# Patient Record
Sex: FEMALE | Race: WHITE | NOT HISPANIC OR LATINO | Employment: FULL TIME | URBAN - METROPOLITAN AREA
[De-identification: names, ages, dates, MRNs, and addresses within clinical notes are randomized per-mention and may not be internally consistent; named-entity substitution may affect disease eponyms.]

---

## 2020-08-24 ENCOUNTER — TRANSFERRED RECORDS (OUTPATIENT)
Dept: HEALTH INFORMATION MANAGEMENT | Facility: CLINIC | Age: 60
End: 2020-08-24

## 2020-08-24 LAB
CHOLEST SERPL-MCNC: 222 MG/DL (ref 100–199)
HDLC SERPL-MCNC: 78 MG/DL
LDLC SERPL CALC-MCNC: 130 MG/DL (ref 0–99)
TRIGL SERPL-MCNC: 69 MG/DL (ref 0–149)

## 2021-04-14 ENCOUNTER — OFFICE VISIT (OUTPATIENT)
Dept: VASCULAR SURGERY | Facility: CLINIC | Age: 61
End: 2021-04-14
Payer: COMMERCIAL

## 2021-04-14 DIAGNOSIS — I83.813 VARICOSE VEINS OF BOTH LOWER EXTREMITIES WITH PAIN: Primary | ICD-10-CM

## 2021-04-14 PROCEDURE — 99203 OFFICE O/P NEW LOW 30 MIN: CPT | Performed by: SURGERY

## 2021-04-14 NOTE — LETTER
4/14/2021         RE: Jess Ureña  [address] [place] [address]        Dear Colleague,    Thank you for referring your patient, Jess Ureña, to the [facility] [place]. Please see a copy of my visit note below.        VEINSOLUTIONS CONSULTATION    HPI:    Jess Ureña is a 60 year old female who presents with complaints of *** lower extremity pain ***and varicose veins.  Varicose veins have been present for *** . The pain is described as an aching, tiredness, heaviness, worse when standing for long periods of time *** and improving with *** .  The patient *** used compression hose *** .  ***  She was evaluated in 05/2019 by Dr. [place] Contreras at [facility] for bilateral stab phlebectomy.***    I have reviewed and updated the history.   PAST MEDICAL HISTORY:   Past Medical History:   Diagnosis Date     Macular degeneration    External hemorrhoids  MINERVA  Migraines  History of leukopenia    Current Outpatient Medications   Medication Sig Dispense Refill     Black Cohosh (REMIFEMIN PO) Take  by mouth.       Multiple Vitamins-Minerals (OCUVITE PRESERVISION PO) Take  by mouth.         PAST SURGICAL HISTORY:   Past Surgical History:   Procedure Laterality Date     COLONOSCOPY      nov 2012, mother with colonic polyps     COLONOSCOPY  11/21/2012    Procedure: COLONOSCOPY;  COLONOSCOPY;  Surgeon: Mackenzie Norwood MD;  Location:  GI     GYN SURGERY      abd [facility] with oopherectomy     partial thyroidectomy[       TONSILLECTOMY     Left GSV RFA ablation (per OSH notes)    ALLERGIES:  No Known Allergies    FAMILY HISTORY:   Family History   Problem Relation Age of Onset     Breast Cancer Mother      Gastrointestinal Disease Mother         colonic polyps     *** has varicose veins in the family. No known family history of hypercoagulability or hemophilias.     SOCIAL HISTORY:   Social History     Tobacco Use     Smoking status: Never Smoker   Substance Use Topics     Alcohol  use: Yes     Comment: 1/month     *** smoker. Works as ***    REVIEW OF SYSTEMS:  10-pt ROS negative except as noted in HPI.       Vital signs:  There were no vitals taken for this visit.    PHYSICAL EXAM:  General: Sitting comfortably in chair, NAD.   HEENT: EOMI and conjugate, MMM   Respiratory: Normal respiratory effort.   Cardiovascular: Pulse is regular.   Musculoskeletal: Normal walking gait around exam room. Grossly normal and symmetric strength and ROM in BLE. *** edema present.  Vascular: dorsalis pedis: ***; posterior tibial: ***.  Cap refill < 3 sec over feet/toes.  Veins: *** varicosities present. *** telangiectasias present.  Neurologic: A&Ox4  Psychiatric: Pleasantly conversant     Venous Duplex Ultrasound:   I have reviewed the following images.  ***      ASSESSMENT / PLAN:  Jess Ureña is a 60 year old female who presents with complaints of ***     ***  I briefly discussed the pathophysiology of venous reflux and how that may be contributing to varicose veins   Will do a venous duplex ultrasound to evaluate for reflux and venous incompetence, along with deep venous patency  Recommended a trial of conservative therapy with 20-30 mmHg ***-high compression hose, to be worn daily  Will have *** follow up in clinic to evaluate response to conservative therapy and discuss further interventions if needed      Estela Schrader MD        Again, thank you for allowing me to participate in the care of your patient.        Sincerely,        Estela Schrader MD

## 2021-04-14 NOTE — PROGRESS NOTES
After Visit Follow Up  Per pt request, faxed their compression hose Rx to [facility].   Pt would like 2 pair(s) of Beige, Closed-toe, Knee high, 20-30mmhg compression hose. Fax confirmed.    Pt aware they will be shipped to their home address.    Miracle Avilez

## 2021-04-14 NOTE — PROGRESS NOTES
After Visit Follow Up  Per Dr. Schrader, patient was recommended to have 2 - 4 sessions at $407 of cosmetic sclerotherapy.    Patient in agreement with plan and had no further questions.    Miracle Avilez on 4/14/2021 at 2:11 PM

## 2021-04-14 NOTE — LETTER
4/14/2021         RE: Jess Ureña  [address] [place] [address]        Dear Colleague,    Thank you for referring your patient, Jess Ureña, to the [facility] [place]. Please see a copy of my visit note below.        VEINSOLUTIONS CONSULTATION    HPI:    Jess Ureña is a 60 year old female who presents with complaints of bilateral lower extremity pain and varicose veins.  Varicose veins have been present for years. She had a prior procedure on the left leg, about 12 years ago: she reports laser ablation of the left leg--unclear if this was the GSV or SSV. The pain is described as an aching, worse when standing for long periods of time and improving with elevation or walking around. Occasional swelling around the ankles. The left leg is the predominantly symptomatic leg. No itching of the skin. No history of blood clots or ulcers. She used compression hose in the remote past but is not currently wearing them.  She also notices that her lateral posterior left leg will feel cold if she sits for a prolonged period of time. She denies numbness or tingling. No weakness. She does not take analgesics.     [facility] [place] [facility]. She was told she would need GETA and did not want to undergo the procedure due to the need for anesthesia.     I have reviewed and updated the history.   PAST MEDICAL HISTORY:   Past Medical History:   Diagnosis Date     Macular degeneration    External hemorrhoids  MINERVA  Migraines  History of leukopenia    Current Outpatient Medications   Medication Sig Dispense Refill     Black Cohosh (REMIFEMIN PO) Take  by mouth.       Multiple Vitamins-Minerals (OCUVITE PRESERVISION PO) Take  by mouth.         PAST SURGICAL HISTORY:   Past Surgical History:   Procedure Laterality Date     AS THYROID LOBECTOMY,UNILAT Left 02/09/2010     COLONOSCOPY      nov 2012, mother with colonic polyps      COLONOSCOPY  11/21/2012    Procedure: COLONOSCOPY;  COLONOSCOPY;  Surgeon: Mackenzie Norwood MD;  Location: [facility]     HYSTERECTOMY TOTAL ABDOMINAL  02/23/2005    with left salpingoopherectomy and right ovarian cystectomy; done for endometriosis     TONSILLECTOMY     Left GSV RFA ablation (per OSH notes)    ALLERGIES:  No Known Allergies    FAMILY HISTORY:   Family History   Problem Relation Age of Onset     Breast Cancer Mother      Gastrointestinal Disease Mother         colonic polyps     Sister has varicose veins in the family. No known family history of hypercoagulability or hemophilias.     SOCIAL HISTORY:   Social History     Tobacco Use     Smoking status: Never Smoker   Substance Use Topics     Alcohol use: Yes     Comment: 1/month     Nonsmoker. Drinks a glass of wine on occasion, not on a daily basis. No illicit drug use. Works as a high school counselor, with prolonged sitting.    REVIEW OF SYSTEMS:  10-pt ROS negative except as noted in HPI.       Vital signs:  There were no vitals taken for this visit.    PHYSICAL EXAM:  General: Sitting comfortably in chair, NAD. Thin.    HEENT: EOMI and conjugate, MMM   Respiratory: Normal respiratory effort.   Cardiovascular: Pulse is regular.   Musculoskeletal: Normal walking gait around exam room. Grossly normal and symmetric strength and ROM in BLE. No edema present.  Vascular: dorsalis pedis: palpable bilaterally; posterior tibial: palpable bilaterally.  Cap refill < 3 sec over feet/toes.  Veins: predominantly left lower leg varicosities present. Telangiectasias present over posterior and lateral left lower leg and thigh, anterior right thigh.  Neurologic: A&Ox4  Psychiatric: Pleasantly conversant         ASSESSMENT / PLAN:  Jess Ureña is a 60 year old female in relatively good health who presents with complaints of bilateral leg aching, worse when standing for long periods of time and improving with elevation; occasional swelling around the ankles;  and varicose veins, predominantly on the left leg.       I briefly discussed the pathophysiology of venous reflux and how that may be contributing to varicose veins     Will do a venous duplex ultrasound to evaluate for reflux and venous incompetence, along with deep venous patency. Unclear which vein was previously ablated on the left leg.     Recommended a trial of conservative therapy with 20-30 mmHg knee or thigh-high compression hose, to be worn daily    Will have her follow up in clinic to evaluate response to conservative therapy and discuss further interventions if needed      Estela Schrader MD      After Visit Follow Up  Per pt request, faxed their compression hose Rx to [facility].   Pt would like 2 pair(s) of Beige, Closed-toe, Knee high, 20-30mmhg compression hose. Fax confirmed.    Pt aware they will be shipped to their home address.    Miracle Avilez    After Visit Follow Up  Per Dr. Schrader, patient was recommended to have 2 - 4 sessions at $407 of cosmetic sclerotherapy.    Patient in agreement with plan and had no further questions.    Miracle Avilez on 4/14/2021 at 2:11 PM      Again, thank you for allowing me to participate in the care of your patient.        Sincerely,        Estela Schrader MD

## 2021-04-14 NOTE — PROGRESS NOTES
VEINSOLUTIONS CONSULTATION    HPI:    Jess Ureña is a 60 year old female who presents with complaints of bilateral lower extremity pain and varicose veins.  Varicose veins have been present for years. She had a prior procedure on the left leg, about 12 years ago: she reports laser ablation of the left leg--unclear if this was the GSV or SSV. The pain is described as an aching, worse when standing for long periods of time and improving with elevation or walking around. Occasional swelling around the ankles. The left leg is the predominantly symptomatic leg. No itching of the skin. No history of blood clots or ulcers. She used compression hose in the remote past but is not currently wearing them.  She also notices that her lateral posterior left leg will feel cold if she sits for a prolonged period of time. She denies numbness or tingling. No weakness. She does not take analgesics.     She was evaluated in 05/2019 by Dr. [place] Contreras at [facility] for bilateral stab phlebectomy. She was told she would need GETA and did not want to undergo the procedure due to the need for anesthesia.     I have reviewed and updated the history.   PAST MEDICAL HISTORY:   Past Medical History:   Diagnosis Date     Macular degeneration    External hemorrhoids  MINERVA  Migraines  History of leukopenia    Current Outpatient Medications   Medication Sig Dispense Refill     Black Cohosh (REMIFEMIN PO) Take  by mouth.       Multiple Vitamins-Minerals (OCUVITE PRESERVISION PO) Take  by mouth.         PAST SURGICAL HISTORY:   Past Surgical History:   Procedure Laterality Date     AS THYROID LOBECTOMY,UNILAT Left 02/09/2010     COLONOSCOPY      nov 2012, mother with colonic polyps     COLONOSCOPY  11/21/2012    Procedure: COLONOSCOPY;  COLONOSCOPY;  Surgeon: Mackenzie Norwood MD;  Location: [facility]     HYSTERECTOMY TOTAL ABDOMINAL  02/23/2005    with left salpingoopherectomy and right ovarian cystectomy; done for endometriosis      TONSILLECTOMY     Left GSV RFA ablation (per OSH notes)    ALLERGIES:  No Known Allergies    FAMILY HISTORY:   Family History   Problem Relation Age of Onset     Breast Cancer Mother      Gastrointestinal Disease Mother         colonic polyps     Sister has varicose veins in the family. No known family history of hypercoagulability or hemophilias.     SOCIAL HISTORY:   Social History     Tobacco Use     Smoking status: Never Smoker   Substance Use Topics     Alcohol use: Yes     Comment: 1/month     Nonsmoker. Drinks a glass of wine on occasion, not on a daily basis. No illicit drug use. Works as a high school counselor, with prolonged sitting.    REVIEW OF SYSTEMS:  10-pt ROS negative except as noted in HPI.       Vital signs:  There were no vitals taken for this visit.    PHYSICAL EXAM:  General: Sitting comfortably in chair, NAD. Thin.    HEENT: EOMI and conjugate, MMM   Respiratory: Normal respiratory effort.   Cardiovascular: Pulse is regular.   Musculoskeletal: Normal walking gait around exam room. Grossly normal and symmetric strength and ROM in BLE. No edema present.  Vascular: dorsalis pedis: palpable bilaterally; posterior tibial: palpable bilaterally.  Cap refill < 3 sec over feet/toes.  Veins: predominantly left lower leg varicosities present. Telangiectasias present over posterior and lateral left lower leg and thigh, anterior right thigh.  Neurologic: A&Ox4  Psychiatric: Pleasantly conversant         ASSESSMENT / PLAN:  Jess Ureña is a 60 year old female in relatively good health who presents with complaints of bilateral leg aching, worse when standing for long periods of time and improving with elevation; occasional swelling around the ankles; and varicose veins, predominantly on the left leg.       I briefly discussed the pathophysiology of venous reflux and how that may be contributing to varicose veins     Will do a venous duplex ultrasound to evaluate for reflux and venous incompetence, along  with deep venous patency. Unclear which vein was previously ablated on the left leg.     Recommended a trial of conservative therapy with 20-30 mmHg knee or thigh-high compression hose, to be worn daily    Will have her follow up in clinic to evaluate response to conservative therapy and discuss further interventions if needed      Estela Schrader MD

## 2021-04-30 ENCOUNTER — ANCILLARY PROCEDURE (OUTPATIENT)
Dept: ULTRASOUND IMAGING | Facility: CLINIC | Age: 61
End: 2021-04-30
Attending: SURGERY
Payer: COMMERCIAL

## 2021-04-30 DIAGNOSIS — I83.813 VARICOSE VEINS OF BOTH LOWER EXTREMITIES WITH PAIN: ICD-10-CM

## 2021-04-30 PROCEDURE — 93970 EXTREMITY STUDY: CPT | Performed by: SURGERY

## 2021-05-12 ENCOUNTER — VIRTUAL VISIT (OUTPATIENT)
Dept: VASCULAR SURGERY | Facility: CLINIC | Age: 61
End: 2021-05-12
Attending: SURGERY
Payer: COMMERCIAL

## 2021-05-12 DIAGNOSIS — I83.813 VARICOSE VEINS OF BOTH LOWER EXTREMITIES WITH PAIN: Primary | ICD-10-CM

## 2021-05-12 PROCEDURE — 99213 OFFICE O/P EST LOW 20 MIN: CPT | Mod: TEL | Performed by: SURGERY

## 2021-05-12 RX ORDER — ESTRADIOL 0.04 MG/D
PATCH, EXTENDED RELEASE TRANSDERMAL
COMMUNITY
Start: 2021-02-24

## 2021-05-12 NOTE — LETTER
5/12/2021         RE: Jess Ureña  [address] [place] [address]        Dear Colleague,    Thank you for referring your patient, Jess Ureña, to the [facility] [place]. Please see a copy of my visit note below.        [facility] Progress Note - TELEHEALTH VISIT     Date: May 12, 2021     Reason for Call:  Ultrasound results review      Subjective:  Jess Ureña reports that she was able to get her compression stockings and she has been wearing them. They may help her a little, but she notices that they make her legs feel cold.     Venous Duplex Ultrasound:   I have reviewed the following images.  Lower Extremity Duplex US (4/30/21):  RIGHT:  The common femoral vein is incompetent and free of thrombus. The remaining deep veins are competent and free of thrombus.   The GSV demonstrates phasic flow, compresses, and responds to augmentations from the saphenofemoral junction to the ankle with no evidence of thrombus. The GSV is incompetent at the junction only with a reflux time of 1419 milliseconds.    The AASV is competent. The Giacomini vein is competent. The SSV is competent. Perforators: There is no evidence of incompetent  veins at any level.      LEFT:  The common femoral and proximal femoral veins are incompetent and free of thrombus. The remaining deep veins are competent and free of thrombus.   The SFJ is patent and incompetent.  The GSV is not visualized from the proximal thigh to the proximal calf (previous vein procedure) and is competent from the mid to distal calf.  The AASV is competent. The Giacomini vein is incompetent (2.7 mm) from the mid to distal thigh with a reflux time of 7704 milliseconds. The Giacomini vein gives rise to several varicose veins in the thigh, the largest measures 3.2 mm with a reflux time of 2854 milliseconds. The SSV is competent. Perforators: There is no evidence of incompetent  veins at any level.      FINAL  SUMMARY:  1.         No evidence of bilateral lower extremity deep vein thrombus.  2.         Right common femoral vein incompetence.  Left common femoral and proximal femoral vein incompetence.  3.         Right greater saphenous vein incompetence at the junction.  4.         Left greater saphenous vein incompetence at the junction.  5.         Left Giacomini vein incompetence.  6.         Left varicose vein incompetence.  7.         The time of incompetence is greater than 500 milliseconds in length.         Assessment & Plan   Jess Ureña is a 60 year old female in relatively good health who presents with complaints of bilateral leg aching, worse when standing for long periods of time and improving with elevation; occasional swelling around the ankles; and varicose veins, predominantly on the left leg.     I have reviewed the ultrasound findings with Ms. Ureña  She has a component of deep venous incompetence, and I believe this is likely what is contributing to the bulk of her symptoms.   I discussed that the best therapy for this (to prevent recurrent symptoms or late changes of venous insufficiency) is lifelong compression hose use, and I encouraged daily compliance with this.   I explained that we could pursue stab phlebectomy as an adjunct to alleviate some of her symptoms if she still feels minimal relief after at least a three month trial of compression therapy, but that this is unlikely to make her legs feel significantly better.   She is interested in sclerotherapy for her telangiectasias. I explained this usually takes 2-4 treatments or more, is cosmetic, and is not covered by insurance.   Will have her follow up in clinic to evaluate response to conservative therapy and discuss further interventions if needed    Estela Schrader    Video / Telephone Call Time: 12 minutes  Video start time: 1.13  Video end time: 1.25      Jess is a 61 year old who is being evaluated via a billable video visit.       How would you like to obtain your AVS? Mail a copy  If the video visit is dropped, the invitation should be resent by: Text to cell phone: [phone]  Will anyone else be joining your video visit? No    Video-Visit Details    Type of service:  Video Visit    Originating Location (pt. Location): Home    Distant Location (provider location):  [facility] [place]     Platform used for Video Visit: [employer]      Again, thank you for allowing me to participate in the care of your patient.        Sincerely,        Estela Schrader MD

## 2021-05-12 NOTE — PROGRESS NOTES
[facility] Progress Note - TELEHEALTH VISIT     Date: May 12, 2021     Reason for Call:  Ultrasound results review      Subjective:  Jess Ureña reports that she was able to get her compression stockings and she has been wearing them. They may help her a little, but she notices that they make her legs feel cold.     Venous Duplex Ultrasound:   I have reviewed the following images.  Lower Extremity Duplex US (4/30/21):  RIGHT:  The common femoral vein is incompetent and free of thrombus. The remaining deep veins are competent and free of thrombus.   The GSV demonstrates phasic flow, compresses, and responds to augmentations from the saphenofemoral junction to the ankle with no evidence of thrombus. The GSV is incompetent at the junction only with a reflux time of 1419 milliseconds.    The AASV is competent. The Giacomini vein is competent. The SSV is competent. Perforators: There is no evidence of incompetent  veins at any level.      LEFT:  The common femoral and proximal femoral veins are incompetent and free of thrombus. The remaining deep veins are competent and free of thrombus.   The SFJ is patent and incompetent.  The GSV is not visualized from the proximal thigh to the proximal calf (previous vein procedure) and is competent from the mid to distal calf.  The AASV is competent. The Giacomini vein is incompetent (2.7 mm) from the mid to distal thigh with a reflux time of 7704 milliseconds. The Giacomini vein gives rise to several varicose veins in the thigh, the largest measures 3.2 mm with a reflux time of 2854 milliseconds. The SSV is competent. Perforators: There is no evidence of incompetent  veins at any level.      FINAL SUMMARY:  1.         No evidence of bilateral lower extremity deep vein thrombus.  2.         Right common femoral vein incompetence.  Left common femoral and proximal femoral vein incompetence.  3.         Right greater saphenous vein incompetence at the  junction.  4.         Left greater saphenous vein incompetence at the junction.  5.         Left Giacomini vein incompetence.  6.         Left varicose vein incompetence.  7.         The time of incompetence is greater than 500 milliseconds in length.         Assessment & Plan   Jess Ureña is a 60 year old female in relatively good health who presents with complaints of bilateral leg aching, worse when standing for long periods of time and improving with elevation; occasional swelling around the ankles; and varicose veins, predominantly on the left leg.     I have reviewed the ultrasound findings with Ms. Ureña  She has a component of deep venous incompetence, and I believe this is likely what is contributing to the bulk of her symptoms.   I discussed that the best therapy for this (to prevent recurrent symptoms or late changes of venous insufficiency) is lifelong compression hose use, and I encouraged daily compliance with this.   I explained that we could pursue stab phlebectomy as an adjunct to alleviate some of her symptoms if she still feels minimal relief after at least a three month trial of compression therapy, but that this is unlikely to make her legs feel significantly better.   She is interested in sclerotherapy for her telangiectasias. I explained this usually takes 2-4 treatments or more, is cosmetic, and is not covered by insurance.   Will have her follow up in clinic to evaluate response to conservative therapy and discuss further interventions if needed    Estela Schrader    Video / Telephone Call Time: 12 minutes  Video start time: 1.13  Video end time: 1.25

## 2021-05-12 NOTE — PROGRESS NOTES
Jess is a 61 year old who is being evaluated via a billable video visit.      How would you like to obtain your AVS? Mail a copy  If the video visit is dropped, the invitation should be resent by: Text to cell phone: [phone]  Will anyone else be joining your video visit? No    Video-Visit Details    Type of service:  Video Visit    Originating Location (pt. Location): Home    Distant Location (provider location):  [facility] [place]     Platform used for Video Visit: [employer]

## 2021-06-23 ENCOUNTER — OFFICE VISIT (OUTPATIENT)
Dept: VASCULAR SURGERY | Facility: CLINIC | Age: 61
End: 2021-06-23

## 2021-06-23 DIAGNOSIS — I78.1 TELANGIECTASIAS: Primary | ICD-10-CM

## 2021-06-23 PROCEDURE — A6533 GC STOCKING THIGHLNGTH 18-30: HCPCS

## 2021-06-23 PROCEDURE — 36468 NJX SCLRSNT SPIDER VEINS: CPT | Performed by: SURGERY

## 2021-06-23 PROCEDURE — S9999 SALES TAX: HCPCS | Performed by: SURGERY

## 2021-06-23 NOTE — PROGRESS NOTES
Patient purchased 1 pair(s) of natural, thigh high, open-toe, size 1 compression hose from the clinic today.     Informed patient all compression hose purchases are final.    Loretta Rahman RN on 6/23/2021 at 2:27 PM

## 2021-06-23 NOTE — PROGRESS NOTES
[facility] Procedure Note    Jess Ureña  June 23, 2021    Jess Ureña is a 61 year old year old female. She presents for Sclerotherapy  .    There were no vitals taken for this visit.    Flowsheet Data 5/12/2021   Side: Right       Sclerotherapy    Date/Time: 6/23/2021 2:17 PM  Performed by: Estela Schrader MD  Authorized by: Estela Schrader MD     Preparation: Patient was prepped and draped in usual sterile fashion    Type:  Cosmetic  Session:  Full  Procedure side:  Bilateral  Solution/Amount:  .5 POLIDOCANOL  Syringes::  5  Patient tolerance:  Patient tolerated the procedure well with no immediate complications  Wrap/Hose:  Wraps and Hose   [facility] Sclerotherapy Note  June 23, 2021    Pre procedure Diagnosis:    Cosmetically displeasing telangiectasias of the legs     Postoperative Diagnosis:  Same     Procedure:   Sclerotherapy of leg telangiectasias     Surgeon:   Estela Schrader MD    Indication:  Ms. Ureña is a 61F who was evaluated in Vein Clinic. She has telangiectasias of the bilateral legs and desired sclerotherapy for management. Details of the procedure including risks of bleeding, infection, ulceration, hyperpigmentation, deep vein thrombosis, and matting were discussed.  The patient understood and wished to proceed.  Informed consent was obtained.      Operative description:  Ms. Ureña was positioned alternately supine and then prone on the procedure table.  Using lighted magnification, the spider veins were identified on bilateral lateral and medial thighs, left lateral calf, bilateral posterior popliteal fossae.  Each syringe of 0.5% Polidocanol was foamed with 2 parts air and 1 part solution.  The skin overlying the telangiectasias and veins was swabbed with alcohol. A 30-gauge needle was used to access the veins of the leg and these were serially injected with the foamed 0.5% Polidocanol, until filling and blanching of the venous network was achieved.  A total of 5 vials of Polidocanol were used.     Hemostasis was obtained with dry gauze and pressure. The legs were then cleaned with saline solution and dressed appropriately with compression stockings.        The patient was asked to walk about 10-15 mins prior to leaving the clinic to return home.  Ms. Ureña will return to clinic in 4-6 weeks for a post-operative check.      Estela Schrader MD  Vascular Surgery          Estela Schrader MD

## 2021-06-23 NOTE — LETTER
6/23/2021         RE: Jess Ureña  [address] [place] [address]        Dear Colleague,    Thank you for referring your patient, Jess Ureña, to the [facility] [place]. Please see a copy of my visit note below.        [facility] Procedure Note    Jess Ureña  June 23, 2021    Jess Ureña is a 61 year old year old female. She presents for Sclerotherapy  .    There were no vitals taken for this visit.    Flowsheet Data 5/12/2021   Side: Right       Sclerotherapy    Date/Time: 6/23/2021 2:17 PM  Performed by: Estela Schrader MD  Authorized by: Estela Schrader MD     Preparation: Patient was prepped and draped in usual sterile fashion    Type:  Cosmetic  Session:  Full  Procedure side:  Bilateral  Solution/Amount:  .5 POLIDOCANOL  Syringes::  5  Patient tolerance:  Patient tolerated the procedure well with no immediate complications  Wrap/Hose:  Wraps and Hose   [facility] Clinic Sclerotherapy Note  June 23, 2021    Pre procedure Diagnosis:    Cosmetically displeasing telangiectasias of the legs     Postoperative Diagnosis:  Same     Procedure:   Sclerotherapy of leg telangiectasias     Surgeon:   Estela Schrader MD    Indication:  Ms. Ureña is a 61F who was evaluated in Vein Clinic. She has telangiectasias of the bilateral legs and desired sclerotherapy for management. Details of the procedure including risks of bleeding, infection, ulceration, hyperpigmentation, deep vein thrombosis, and matting were discussed.  The patient understood and wished to proceed.  Informed consent was obtained.      Operative description:  Ms. Ureña was positioned alternately supine and then prone on the procedure table.  Using lighted magnification, the spider veins were identified on bilateral lateral and medial thighs, left lateral calf, bilateral posterior popliteal fossae.  Each syringe of 0.5% Polidocanol was foamed with 2 parts air and 1 part solution.  The skin  overlying the telangiectasias and veins was swabbed with alcohol. A 30-gauge needle was used to access the veins of the leg and these were serially injected with the foamed 0.5% Polidocanol, until filling and blanching of the venous network was achieved. A total of 5 vials of Polidocanol were used.     Hemostasis was obtained with dry gauze and pressure. The legs were then cleaned with saline solution and dressed appropriately with compression stockings.        The patient was asked to walk about 10-15 mins prior to leaving the clinic to return home.  Ms. Ureña will return to clinic in 4-6 weeks for a post-operative check.      Estela Schrader MD  Vascular Surgery          Estela Schrader MD      Again, thank you for allowing me to participate in the care of your patient.        Sincerely,        Estela Schrader MD

## 2021-08-11 ENCOUNTER — OFFICE VISIT (OUTPATIENT)
Dept: VASCULAR SURGERY | Facility: CLINIC | Age: 61
End: 2021-08-11

## 2021-08-11 DIAGNOSIS — I78.1 TELANGIECTASIAS: Primary | ICD-10-CM

## 2021-08-11 PROCEDURE — A6533 GC STOCKING THIGHLNGTH 18-30: HCPCS

## 2021-08-11 PROCEDURE — 36468 NJX SCLRSNT SPIDER VEINS: CPT | Performed by: SURGERY

## 2021-08-11 PROCEDURE — 99207 PR NO CHARGE NURSE ONLY: CPT

## 2021-08-11 PROCEDURE — S9999 SALES TAX: HCPCS | Performed by: SURGERY

## 2021-08-11 NOTE — PROGRESS NOTES
Patient purchased 1 pair(s) of beige, thigh high, open-toe, size 1 compression hose from the clinic today.     Informed patient all compression hose purchases are final.    ARVIN Hernandez on 8/11/2021 at 4:11 PM

## 2021-08-11 NOTE — PROGRESS NOTES
[facility] Sclerotherapy Note  August 11, 2021    Pre procedure Diagnosis:    Cosmetically displeasing telangiectasias of the legs     Postoperative Diagnosis:  Same     Procedure:   Sclerotherapy of leg telangiectasias     Surgeon:   Estela Schrader MD    Indication:  Ms. Ureña is a 61F who was evaluated in Vein Clinic. She has telangiectasias of the bilateral legs and desired sclerotherapy for management; she has previously undergone 1 session of sclerotherapy and has persistent prominence of the left lateral and posterior thigh, left posterior popliteal fossa, and right lateral thigh telangiectasias. Details of the procedure including risks of bleeding, infection, ulceration, hyperpigmentation, deep vein thrombosis, and matting were discussed.  The patient understood and wished to proceed.  Informed consent was obtained.      Operative description:  Ms. Ureña was positioned alternately supine and then prone on the procedure table.  Using lighted magnification, the spider veins were identified.  Each syringe of 0.5% Polidocanol was foamed with 2 parts air and 1 part solution.  The skin overlying the telangiectasias and veins was swabbed with alcohol. A 30-gauge needle was used to access the veins of the leg and these were serially injected with the foamed 0.5% Polidocanol, until filling and blanching of the venous network was achieved. A total of 2 vials of Polidocanol were used.     Hemostasis was obtained with dry gauze and pressure. The legs were then cleaned with saline solution and dressed appropriately with compression stockings.        The patient was asked to walk about 10-15 mins prior to leaving the clinic to return home.  She will return to clinic in 4-6 weeks for a post-operative check.      Sclerotherapy    Date/Time: 8/11/2021 4:51 PM  Performed by: Estela Schrader MD  Authorized by: Estela Schrader MD     Time out: Immediately prior to the procedure a time out was  called    Preparation: Patient was prepped and draped in usual sterile fashion    1st Assist:  Miracle Avilez CST/DECLAN  Type:  Cosmetic  Session:  Full  Procedure side:  Bilateral  Solution/Amount:  .5 POLIDOCANOL  Syringes::  4  Evacuation of intraluminal thrombus under sterile conditions without complications.    Patient tolerance:  Patient tolerated the procedure well with no immediate complications  Wrap/Hose:  Wraps and Hose        Estela Schrader MD  Vascular Surgery

## 2021-08-11 NOTE — LETTER
8/11/2021         RE: Jess Ureña  [address] [place] [address]        Dear Colleague,    Thank you for referring your patient, Jess Ureña, to the [facility] [place]. Please see a copy of my visit note below.        [facility] Clinic Sclerotherapy Note  August 11, 2021    Pre procedure Diagnosis:    Cosmetically displeasing telangiectasias of the legs     Postoperative Diagnosis:  Same     Procedure:   Sclerotherapy of leg telangiectasias     Surgeon:   Estela cShrader MD    Indication:  Ms. Ureña is a 61F who was evaluated in Vein Clinic. She has telangiectasias of the bilateral legs and desired sclerotherapy for management; she has previously undergone 1 session of sclerotherapy and has persistent prominence of the left lateral and posterior thigh, left posterior popliteal fossa, and right lateral thigh telangiectasias. Details of the procedure including risks of bleeding, infection, ulceration, hyperpigmentation, deep vein thrombosis, and matting were discussed.  The patient understood and wished to proceed.  Informed consent was obtained.      Operative description:  Ms. Ureña was positioned alternately supine and then prone on the procedure table.  Using lighted magnification, the spider veins were identified.  Each syringe of 0.5% Polidocanol was foamed with 2 parts air and 1 part solution.  The skin overlying the telangiectasias and veins was swabbed with alcohol. A 30-gauge needle was used to access the veins of the leg and these were serially injected with the foamed 0.5% Polidocanol, until filling and blanching of the venous network was achieved. A total of 2 vials of Polidocanol were used.     Hemostasis was obtained with dry gauze and pressure. The legs were then cleaned with saline solution and dressed appropriately with compression stockings.        The patient was asked to walk about 10-15 mins prior to leaving the clinic to return home.  She will return to  clinic in 4-6 weeks for a post-operative check.      Sclerotherapy    Date/Time: 8/11/2021 4:51 PM  Performed by: Estela Schrader MD  Authorized by: Estela Shcrader MD     Time out: Immediately prior to the procedure a time out was called    Preparation: Patient was prepped and draped in usual sterile fashion    1st Assist:  Miracle Avilez CST/CSFA  Type:  Cosmetic  Session:  Full  Procedure side:  Bilateral  Solution/Amount:  .5 POLIDOCANOL  Syringes::  4  Evacuation of intraluminal thrombus under sterile conditions without complications.    Patient tolerance:  Patient tolerated the procedure well with no immediate complications  Wrap/Hose:  Wraps and Hose        Estela Schrader MD  Vascular Surgery            Again, thank you for allowing me to participate in the care of your patient.        Sincerely,        Estela Schrader MD

## 2021-10-06 ENCOUNTER — OFFICE VISIT (OUTPATIENT)
Dept: VASCULAR SURGERY | Facility: CLINIC | Age: 61
End: 2021-10-06

## 2021-10-06 DIAGNOSIS — I78.1 TELANGIECTASIAS: Primary | ICD-10-CM

## 2021-10-06 PROCEDURE — S9999 SALES TAX: HCPCS | Performed by: SURGERY

## 2021-10-06 NOTE — LETTER
10/6/2021         RE: Jess Ureña  [address] [place] [address]        Dear Colleague,    Thank you for referring your patient, Jess Ureña, to the [facility] [place]. Please see a copy of my visit note below.        Vein Clinic Sclerotherapy Note  October 7, 2021    Pre procedure Diagnosis:    Cosmetically displeasing telangiectasias of the legs     Postoperative Diagnosis:  Same     Procedure:   Sclerotherapy of leg telangiectasias     Surgeon:   Estela Schrader MD    Indication:  Ms. Ureña is a 61F who was evaluated in Vein Clinic. She has telangiectasias of the bilateral legs and desired repeat sclerotherapy for management. Details of the procedure including risks of bleeding, infection, ulceration, hyperpigmentation, deep vein thrombosis, and matting were discussed.  The patient understood and wished to proceed.  Informed consent was obtained.      Operative description:  Ms. Ureña was positioned alternately supine and then prone on the procedure table.  Using lighted magnification, the spider veins were identified on bilateral lateral knees, posterior popliteal fossae, anterior left shin.  Each syringe of 0.5% Polidocanol was foamed with 2 parts air and 1 part solution.  The skin overlying the telangiectasias and veins was swabbed with alcohol. A 30-gauge needle was used to access the veins of the leg and these were serially injected with the foamed 0.5% Polidocanol, until filling and blanching of the venous network was achieved. A total of 1.5 vials of Polidocanol were used.     Hemostasis was obtained with dry gauze and pressure. The legs were then cleaned with saline solution and dressed appropriately with compression stockings.        The patient was asked to walk about 10-15 mins prior to leaving the clinic to return home.  She will return to clinic in 4-6 weeks for a post-operative check.      Sclerotherapy    Date/Time: 10/7/2021 10:00 AM  Performed by:  Estela Schrader MD  Authorized by: Estela Schrader MD     Time out: Immediately prior to the procedure a time out was called    Preparation: Patient was prepped and draped in usual sterile fashion    Type:  Cosmetic  Session:  Limited  Procedure side:  Bilateral  Solution/Amount:  .5 POLIDOCANOL  Syringes::  3  Wrap/Hose:  Hose        Estela Schrader MD  Vascular Surgery        Again, thank you for allowing me to participate in the care of your patient.        Sincerely,        Estela Schrader MD

## 2021-10-06 NOTE — PATIENT INSTRUCTIONS
SCLEROTHERAPY AFTER CARE  Immediately:  After treatment, walk for 10-15 minutes before getting in your car.  If your trip home is more than 1 hour, stop and walk around for 5-10 minutes. Avoid sitting or standing for extended periods.   First 24 hours: Wear your compression continuously, even while in bed. After the 24 hours, you may shower if you want to. Put your hose back on, unless you are going to bed. You should NOT wear compression to bed after the first 24 hours. You may fly the next day, but wear your compression.   For 5 days: Wear the compression hose for waking hours only. You may continue to wear them longer than 5 days if you prefer.   For days 5-7: Walking is encouraged, as it promotes efficient circulation in your veins. You may do activities that raise your heart rate, but do NOT run, jog, do high impact aerobics, or weight lifting. After 7 days, no activity restrictions.    Shaving: Wait a few days to shave or apply lotion.   Bathing: Do NOT take hot baths or sit in a hot tub for 7-10 days.    For 1 year: Wear SPF 30 sunscreen on your legs when in the sun. This is very important! It helps prevent darkening of the skin at the injection sites.   Medications: You may resume your usual medications, including aspirin or ibuprofen.    Common Things to Expect  -  Compression must be worn for the first 24 hours and then during the day for 5-7 days.    -  If larger veins are treated with ultrasound-guided sclerotherapy, you will have redness, firmness, tenderness, and swelling.  This firmness and tenderness may take 3-6 months to resolve. Ibuprofen and compression hose will aid in this process.    -  You will have bruising that can last up to 3 weeks. Most fading of the veins will occur between 3 and 6 weeks after treatment.    -  You may notice brown discoloration (hyperpigmentation) at the treatment site.  This should fade with time, but will take 3 months to 1 year to fully heal.     -  Some treated  veins may look darker because of trapped blood within the vein. This trapped blood can be removed at a minimum of 1 month following treatment. Larger veins are more likely to develop trapped blood.    -  It is very important for you to use at least SPF 30 sunscreen in order to help prevent the discoloration of your skin.    -  Migraines rarely occur following sclerotherapy, but are more likely in patients with a history of migraines.  Treat as you would any other migraine.

## 2021-10-07 PROCEDURE — 36468 NJX SCLRSNT SPIDER VEINS: CPT | Performed by: SURGERY

## 2021-10-07 NOTE — PROGRESS NOTES
[facility] Clinic Sclerotherapy Note  October 7, 2021    Pre procedure Diagnosis:    Cosmetically displeasing telangiectasias of the legs     Postoperative Diagnosis:  Same     Procedure:   Sclerotherapy of leg telangiectasias     Surgeon:   Estela Schrader MD    Indication:  Ms. Ureña is a 61F who was evaluated in Vein Clinic. She has telangiectasias of the bilateral legs and desired repeat sclerotherapy for management. Details of the procedure including risks of bleeding, infection, ulceration, hyperpigmentation, deep vein thrombosis, and matting were discussed.  The patient understood and wished to proceed.  Informed consent was obtained.      Operative description:  Ms. Ureña was positioned alternately supine and then prone on the procedure table.  Using lighted magnification, the spider veins were identified on bilateral lateral knees, posterior popliteal fossae, anterior left shin.  Each syringe of 0.5% Polidocanol was foamed with 2 parts air and 1 part solution.  The skin overlying the telangiectasias and veins was swabbed with alcohol. A 30-gauge needle was used to access the veins of the leg and these were serially injected with the foamed 0.5% Polidocanol, until filling and blanching of the venous network was achieved. A total of 1.5 vials of Polidocanol were used.     Hemostasis was obtained with dry gauze and pressure. The legs were then cleaned with saline solution and dressed appropriately with compression stockings.        The patient was asked to walk about 10-15 mins prior to leaving the clinic to return home.  She will return to clinic in 4-6 weeks for a post-operative check.      Sclerotherapy    Date/Time: 10/7/2021 10:00 AM  Performed by: Estela Schrader MD  Authorized by: Estela Schrader MD     Time out: Immediately prior to the procedure a time out was called    Preparation: Patient was prepped and draped in usual sterile fashion    Type:  Cosmetic  Session:   Limited  Procedure side:  Bilateral  Solution/Amount:  .5 POLIDOCANOL  Syringes::  3  Wrap/Hose:  Sam Schrader MD  Vascular Surgery

## 2021-11-24 ENCOUNTER — OFFICE VISIT (OUTPATIENT)
Dept: VASCULAR SURGERY | Facility: CLINIC | Age: 61
End: 2021-11-24

## 2021-11-24 DIAGNOSIS — I78.1 TELANGIECTASIAS: Primary | ICD-10-CM

## 2021-11-24 PROCEDURE — 36468 NJX SCLRSNT SPIDER VEINS: CPT | Performed by: SURGERY

## 2021-11-24 PROCEDURE — S9999 SALES TAX: HCPCS | Performed by: SURGERY

## 2021-11-24 NOTE — LETTER
11/24/2021         RE: Jess Ureña  [address] [place] [address]        Dear Colleague,    Thank you for referring your patient, Jess Ureña, to the [facility] [place]. Please see a copy of my visit note below.        Vein Clinic Sclerotherapy Note  November 24, 2021    Pre procedure Diagnosis:    Cosmetically displeasing telangiectasias of the legs     Postoperative Diagnosis:  Same     Procedure:   Sclerotherapy of leg telangiectasias     Surgeon:   Estela Schrader MD    Indication:  Ms. Ureña is a 61F who was evaluated in Vein Clinic. She has telangiectasias of the bilateral legs and desired repeat sclerotherapy for management. She has telangiectasias of the bilateral legs and desired sclerotherapy for management. Details of the procedure including risks of bleeding, infection, ulceration, hyperpigmentation, deep vein thrombosis, and matting were discussed.  The patient understood and wished to proceed.  Informed consent was obtained.   Anterior legs treated bilaterally today. 3 prior treatments.     Operative description:  Ms. Ureña was positioned supine on the procedure table.  Using lighted magnification, the spider veins were identified on bilateral anterior legs.  Each syringe of 0.5% Polidocanol was foamed with 2 parts air and 1 part solution.  The skin overlying the telangiectasias and veins was swabbed with alcohol. A 30-gauge needle was used to access the veins of the leg and these were serially injected with the foamed 0.5% Polidocanol, until filling and blanching of the venous network was achieved. A total of 1 vial of Polidocanol was used.     Hemostasis was obtained with dry gauze and pressure. The legs were then cleaned with saline solution and dressed appropriately with compression stockings.        The patient was asked to walk about 10-15 mins prior to leaving the clinic to return home.  She will return to clinic in 4-6 weeks for a post-operative  check.      Sclerotherapy    Date/Time: 11/24/2021 5:37 PM  Performed by: Estela Schrader MD  Authorized by: Estela Schrader MD     Time out: Immediately prior to the procedure a time out was called    Preparation: Patient was prepped and draped in usual sterile fashion    Circulator:  Loretta Rahman RN  Type:  Cosmetic  Session:  Limited  Procedure side:  Bilateral  Solution/Amount:  .5 POLIDOCANOL  Syringes::  2  Patient tolerance:  Patient tolerated the procedure well with no immediate complications  Wrap/Hose:  Hose        Estela Schrader MD  Vascular Surgery          Again, thank you for allowing me to participate in the care of your patient.        Sincerely,        Estela Schrader MD

## 2021-11-24 NOTE — PROGRESS NOTES
[facility]in Clinic Sclerotherapy Note  November 24, 2021    Pre procedure Diagnosis:    Cosmetically displeasing telangiectasias of the legs     Postoperative Diagnosis:  Same     Procedure:   Sclerotherapy of leg telangiectasias     Surgeon:   Estela Schrader MD    Indication:  Ms. Ureña is a 61F who was evaluated in Vein Clinic. She has telangiectasias of the bilateral legs and desired repeat sclerotherapy for management. She has telangiectasias of the bilateral legs and desired sclerotherapy for management. Details of the procedure including risks of bleeding, infection, ulceration, hyperpigmentation, deep vein thrombosis, and matting were discussed.  The patient understood and wished to proceed.  Informed consent was obtained.   Anterior legs treated bilaterally today. 3 prior treatments.     Operative description:  Ms. Ureña was positioned supine on the procedure table.  Using lighted magnification, the spider veins were identified on bilateral anterior legs.  Each syringe of 0.5% Polidocanol was foamed with 2 parts air and 1 part solution.  The skin overlying the telangiectasias and veins was swabbed with alcohol. A 30-gauge needle was used to access the veins of the leg and these were serially injected with the foamed 0.5% Polidocanol, until filling and blanching of the venous network was achieved. A total of 1 vial of Polidocanol was used.     Hemostasis was obtained with dry gauze and pressure. The legs were then cleaned with saline solution and dressed appropriately with compression stockings.        The patient was asked to walk about 10-15 mins prior to leaving the clinic to return home.  She will return to clinic in 4-6 weeks for a post-operative check.      Sclerotherapy    Date/Time: 11/24/2021 5:37 PM  Performed by: Estela Schrader MD  Authorized by: Estela Schrader MD     Time out: Immediately prior to the procedure a time out was called    Preparation: Patient was prepped and  draped in usual sterile fashion    Circulator:  Loretta Rahman RN  Type:  Cosmetic  Session:  Limited  Procedure side:  Bilateral  Solution/Amount:  .5 POLIDOCANOL  Syringes::  2  Patient tolerance:  Patient tolerated the procedure well with no immediate complications  Wrap/Hose:  Sam Schrader MD  Vascular Surgery

## 2022-02-08 ENCOUNTER — TELEPHONE (OUTPATIENT)
Dept: VASCULAR SURGERY | Facility: CLINIC | Age: 62
End: 2022-02-08
Payer: COMMERCIAL

## 2022-02-08 NOTE — TELEPHONE ENCOUNTER
Per pt request, faxed their compression hose Rx to [facility] at [phone].   Pt would like 1 pair(s) of natural, open-toe, knee high, 20-30mmhg compression hose. Fax confirmed.    Pt aware they will be shipped to their home address.    ARVIN Hernandez

## 2022-02-09 ENCOUNTER — OFFICE VISIT (OUTPATIENT)
Dept: VASCULAR SURGERY | Facility: CLINIC | Age: 62
End: 2022-02-09

## 2022-02-09 DIAGNOSIS — I78.1 TELANGIECTASIAS: Primary | ICD-10-CM

## 2022-02-09 PROCEDURE — 36468 NJX SCLRSNT SPIDER VEINS: CPT | Performed by: SURGERY

## 2022-02-09 PROCEDURE — S9999 SALES TAX: HCPCS | Performed by: SURGERY

## 2022-02-09 NOTE — PROGRESS NOTES
Vein Clinic Sclerotherapy Note  February 9, 2022    Pre procedure Diagnosis:    Cosmetically displeasing telangiectasias of the legs     Postoperative Diagnosis:  Same     Procedure:   Sclerotherapy of leg telangiectasias     Surgeon:   Estela Schrader MD    Indication:  Ms. Ureña is a 61F who was evaluated in Vein Clinic. She has telangiectasias of the bilateral lower legs, previously treated with sclerotherapy, and desired further sclerotherapy for management. Details of the procedure including risks of bleeding, infection, ulceration, hyperpigmentation, deep vein thrombosis, and matting were discussed.  The patient understood and wished to proceed.  Informed consent was obtained.      Operative description:  Ms. Ureña was positioned alternately supine and then prone on the procedure table.  Using lighted magnification, the spider veins were identified on bilateral posterior legs, lateral left leg, lateral right thigh.  Each syringe of 0.5% Polidocanol was foamed with 2 parts air and 1 part solution.  The skin overlying the telangiectasias and veins was swabbed with alcohol. A 30-gauge needle was used to access the veins of the leg and these were serially injected with the foamed 0.5% Polidocanol, until filling and blanching of the venous network was achieved. A total of 2 vials of Polidocanol were used.     Hemostasis was obtained with dry gauze and pressure. The legs were then cleaned with saline solution and dressed appropriately with compression stockings.        The patient was asked to walk about 10-15 mins prior to leaving the clinic to return home.  She will return to clinic in 4-6 weeks for a post-operative check.      Sclerotherapy    Date/Time: 2/9/2022 2:10 PM  Performed by: Estela Schrader MD  Authorized by: Estela Schrader MD     Time out: Immediately prior to the procedure a time out was called    Preparation: Patient was prepped and draped in usual sterile fashion     1st Assist: Miracle Avilez CST/DECLAN     Type:  Cosmetic  Session:  Full  Procedure side:  Bilateral  Solution/Amount:  .5 POLIDOCANOL  Patient tolerance:  Patient tolerated the procedure well with no immediate complications  Wrap/Hose:  Sam Schrader MD  Vascular Surgery

## 2022-02-09 NOTE — LETTER
2/9/2022         RE: Jess Ureña  [address] [place] [address]        Dear Colleague,    Thank you for referring your patient, Jess Ureña, to the [facility] [place]. Please see a copy of my visit note below.        Vein Clinic Sclerotherapy Note  February 9, 2022    Pre procedure Diagnosis:    Cosmetically displeasing telangiectasias of the legs     Postoperative Diagnosis:  Same     Procedure:   Sclerotherapy of leg telangiectasias     Surgeon:   Estela Schrader MD    Indication:  Ms. Ureña is a 61F who was evaluated in Vein Clinic. She has telangiectasias of the bilateral lower legs, previously treated with sclerotherapy, and desired further sclerotherapy for management. Details of the procedure including risks of bleeding, infection, ulceration, hyperpigmentation, deep vein thrombosis, and matting were discussed.  The patient understood and wished to proceed.  Informed consent was obtained.      Operative description:  Ms. Ureña was positioned alternately supine and then prone on the procedure table.  Using lighted magnification, the spider veins were identified on bilateral posterior legs, lateral left leg, lateral right thigh.  Each syringe of 0.5% Polidocanol was foamed with 2 parts air and 1 part solution.  The skin overlying the telangiectasias and veins was swabbed with alcohol. A 30-gauge needle was used to access the veins of the leg and these were serially injected with the foamed 0.5% Polidocanol, until filling and blanching of the venous network was achieved. A total of 2 vials of Polidocanol were used.     Hemostasis was obtained with dry gauze and pressure. The legs were then cleaned with saline solution and dressed appropriately with compression stockings.        The patient was asked to walk about 10-15 mins prior to leaving the clinic to return home.  She will return to clinic in 4-6 weeks for a post-operative check.      Sclerotherapy    Date/Time:  2/9/2022 2:10 PM  Performed by: Estela Schrader MD  Authorized by: Estela Schrader MD     Time out: Immediately prior to the procedure a time out was called    Preparation: Patient was prepped and draped in usual sterile fashion    1st Assist: Miracle Avilez CST/CSFA     Type:  Cosmetic  Session:  Full  Procedure side:  Bilateral  Solution/Amount:  .5 POLIDOCANOL  Patient tolerance:  Patient tolerated the procedure well with no immediate complications  Wrap/Hose:  Sam Schrader MD  Vascular Surgery        Again, thank you for allowing me to participate in the care of your patient.        Sincerely,        Estela Schrader MD

## 2022-03-23 ENCOUNTER — OFFICE VISIT (OUTPATIENT)
Dept: VASCULAR SURGERY | Facility: CLINIC | Age: 62
End: 2022-03-23

## 2022-03-23 DIAGNOSIS — I78.1 TELANGIECTASIAS: Primary | ICD-10-CM

## 2022-03-23 PROCEDURE — 36468 NJX SCLRSNT SPIDER VEINS: CPT | Performed by: SURGERY

## 2022-03-23 PROCEDURE — S9999 SALES TAX: HCPCS | Performed by: SURGERY

## 2022-03-23 NOTE — PROGRESS NOTES
Vein Clinic Sclerotherapy Note  March 23, 2022    Pre procedure Diagnosis:    Cosmetically displeasing telangiectasias of the legs     Postoperative Diagnosis:  Same     Procedure:   Sclerotherapy of leg telangiectasias     Surgeon:   Estela Schrader MD    Indication:  Ms. Ureña is a 61F who was evaluated in Vein Clinic. She has telangiectasias of the bilateral legs and desired repeat sclerotherapy for management. Details of the procedure including risks of bleeding, infection, ulceration, hyperpigmentation, deep vein thrombosis, and matting were discussed.  The patient understood and wished to proceed.  Informed consent was obtained.      Operative description:  Ms. Ureña was positioned alternately supine and then prone on the procedure table.  Using lighted magnification, the spider veins were identified primarily along the lateral and posterior legs.  Each syringe of 0.5% Polidocanol was foamed with 2 parts air and 1 part solution.  The skin overlying the telangiectasias and veins was swabbed with alcohol. A 30-gauge needle was used to access the veins of the leg and these were serially injected with the foamed 0.5% Polidocanol, until filling and blanching of the venous network was achieved. A total of 2 vials of Polidocanol were used.     Hemostasis was obtained with dry gauze and pressure. The legs were then cleaned with saline solution and dressed appropriately with compression stockings.        The patient was asked to walk about 10-15 mins prior to leaving the clinic to return home.  She will return to clinic in 4-6 weeks for a post-operative check.      Sclerotherapy    Date/Time: 3/23/2022 2:11 PM  Performed by: Estela Schrader MD  Authorized by: Estela Schrader MD     Time out: Immediately prior to the procedure a time out was called    Preparation: Patient was prepped and draped in usual sterile fashion    Circulator: Pravin Quinonez RN     Type:  Cosmetic  Session:   Full  Procedure side:  Bilateral  Solution/Amount:  .5 POLIDOCANOL  Syringes:  4  Patient tolerance:  Patient tolerated the procedure well with no immediate complications  Wrap/Hose:  Sam Schrader MD  Vascular Surgery

## 2022-03-23 NOTE — LETTER
3/23/2022         RE: Jess Ureña  [address] [place] [address]        Dear Colleague,    Thank you for referring your patient, Jess Ureña, to the [facility] VEIN CL[facility] [place]. Please see a copy of my visit note below.        Vein Clinic Sclerotherapy Note  March 23, 2022    Pre procedure Diagnosis:    Cosmetically displeasing telangiectasias of the legs     Postoperative Diagnosis:  Same     Procedure:   Sclerotherapy of leg telangiectasias     Surgeon:   Estela Schrader MD    Indication:  Ms. Ureña is a 61F who was evaluated in Vein Clinic. She has telangiectasias of the bilateral legs and desired repeat sclerotherapy for management. Details of the procedure including risks of bleeding, infection, ulceration, hyperpigmentation, deep vein thrombosis, and matting were discussed.  The patient understood and wished to proceed.  Informed consent was obtained.      Operative description:  Ms. Ureña was positioned alternately supine and then prone on the procedure table.  Using lighted magnification, the spider veins were identified primarily along the lateral and posterior legs.  Each syringe of 0.5% Polidocanol was foamed with 2 parts air and 1 part solution.  The skin overlying the telangiectasias and veins was swabbed with alcohol. A 30-gauge needle was used to access the veins of the leg and these were serially injected with the foamed 0.5% Polidocanol, until filling and blanching of the venous network was achieved. A total of 2 vials of Polidocanol were used.     Hemostasis was obtained with dry gauze and pressure. The legs were then cleaned with saline solution and dressed appropriately with compression stockings.        The patient was asked to walk about 10-15 mins prior to leaving the clinic to return home.  She will return to clinic in 4-6 weeks for a post-operative check.      Sclerotherapy    Date/Time: 3/23/2022 2:11 PM  Performed by: Estela Schrader,  MD  Authorized by: Estela Schrader MD     Time out: Immediately prior to the procedure a time out was called    Preparation: Patient was prepped and draped in usual sterile fashion    Circulator: Pravin Quinonez RN     Type:  Cosmetic  Session:  Full  Procedure side:  Bilateral  Solution/Amount:  .5 POLIDOCANOL  Syringes:  4  Patient tolerance:  Patient tolerated the procedure well with no immediate complications  Wrap/Hose:  Hose        Estela Schrader MD  Vascular Surgery          Again, thank you for allowing me to participate in the care of your patient.        Sincerely,        Estela Schrader MD

## 2022-04-20 ENCOUNTER — OFFICE VISIT (OUTPATIENT)
Dept: VASCULAR SURGERY | Facility: CLINIC | Age: 62
End: 2022-04-20

## 2022-04-20 DIAGNOSIS — I78.1 TELANGIECTASIAS: Primary | ICD-10-CM

## 2022-04-20 PROCEDURE — 36468 NJX SCLRSNT SPIDER VEINS: CPT | Performed by: SURGERY

## 2022-04-20 PROCEDURE — S9999 SALES TAX: HCPCS | Performed by: SURGERY

## 2022-04-20 NOTE — PROGRESS NOTES
Vein Clinic Sclerotherapy Note  April 20, 2022    Pre procedure Diagnosis:    Cosmetically displeasing telangiectasias of the legs bilaterally     Postoperative Diagnosis:  Same     Procedure:   Sclerotherapy of leg telangiectasias     Surgeon:   Estela Schrader MD    Indication:  Ms. Ureña is a 61F who was evaluated in Vein Clinic for telangiectasias of the bilateral legs; she has undergone sclerotherapy injections and desired repeat sclerotherapy for management. Details of the procedure including risks of bleeding, infection, ulceration, hyperpigmentation, deep vein thrombosis, and matting were discussed.  The patient understood and wished to proceed.  Informed consent was obtained.      Operative description:  Ms. Ureña was positioned alternately supine and then prone on the procedure table.  Using lighted magnification, the spider veins were identified primarily on the medial and lateral knees and anteromedial thighs.  Each syringe of 0.5% Polidocanol was foamed with 2 parts air and 1 part solution.  The skin overlying the telangiectasias and veins was swabbed with alcohol. A 30-gauge needle was used to access the veins of the leg and these were serially injected with the foamed 0.5% Polidocanol, until filling and blanching of the venous network was achieved. A total of 1 vial of Polidocanol was used.     Hemostasis was obtained with dry gauze and pressure. The legs were then cleaned with saline solution and dressed appropriately with compression stockings.        The patient was asked to walk about 10-15 mins prior to leaving the clinic to return home.  She will return to clinic in 4-6 weeks for a post-operative check.      Sclerotherapy    Date/Time: 4/20/2022 3:23 PM  Performed by: Estela Schrader MD  Authorized by: Estela Schrader MD     Time out: Immediately prior to the procedure a time out was called    Preparation: Patient was prepped and draped in usual sterile fashion     Circulator: Miracle Avilez CST/DECLAN     Type:  Cosmetic  Session:  Limited  Procedure side:  Bilateral  Solution/Amount:  .5 POLIDOCANOL  Syringes:  2  Patient tolerance:  Patient tolerated the procedure well with no immediate complications  Wrap/Hose:  Sam Schrader MD  Vascular Surgery

## 2022-04-20 NOTE — LETTER
4/20/2022         RE: Jess Ureña  [address] [place] [address]        Dear Colleague,    Thank you for referring your patient, Jess Ureña, to the [facility] VEIN CL[facility] [employer]. Please see a copy of my visit note below.        Vein Clinic Sclerotherapy Note  April 20, 2022    Pre procedure Diagnosis:    Cosmetically displeasing telangiectasias of the legs bilaterally     Postoperative Diagnosis:  Same     Procedure:   Sclerotherapy of leg telangiectasias     Surgeon:   Estela Schrader MD    Indication:  Ms. Ureña is a 61F who was evaluated in Vein Clinic for telangiectasias of the bilateral legs; she has undergone sclerotherapy injections and desired repeat sclerotherapy for management. Details of the procedure including risks of bleeding, infection, ulceration, hyperpigmentation, deep vein thrombosis, and matting were discussed.  The patient understood and wished to proceed.  Informed consent was obtained.      Operative description:  Ms. Ureña was positioned alternately supine and then prone on the procedure table.  Using lighted magnification, the spider veins were identified primarily on the medial and lateral knees and anteromedial thighs.  Each syringe of 0.5% Polidocanol was foamed with 2 parts air and 1 part solution.  The skin overlying the telangiectasias and veins was swabbed with alcohol. A 30-gauge needle was used to access the veins of the leg and these were serially injected with the foamed 0.5% Polidocanol, until filling and blanching of the venous network was achieved. A total of 1 vial of Polidocanol was used.     Hemostasis was obtained with dry gauze and pressure. The legs were then cleaned with saline solution and dressed appropriately with compression stockings.        The patient was asked to walk about 10-15 mins prior to leaving the clinic to return home.  She will return to clinic in 4-6 weeks for a post-operative check.      Sclerotherapy    Date/Time:  4/20/2022 3:23 PM  Performed by: Estela Schrader MD  Authorized by: Estela Schrader MD     Time out: Immediately prior to the procedure a time out was called    Preparation: Patient was prepped and draped in usual sterile fashion    Circulator: Miracle Avilez CST/DECLAN     Type:  Cosmetic  Session:  Limited  Procedure side:  Bilateral  Solution/Amount:  .5 POLIDOCANOL  Syringes:  2  Patient tolerance:  Patient tolerated the procedure well with no immediate complications  Wrap/Hose:  Sam Schrader MD  Vascular Surgery        Again, thank you for allowing me to participate in the care of your patient.        Sincerely,        Estela Schrader MD

## 2022-09-10 ENCOUNTER — HOSPITAL ENCOUNTER (EMERGENCY)
Facility: CLINIC | Age: 62
Discharge: HOME | End: 2022-09-10
Attending: EMERGENCY MEDICINE | Admitting: EMERGENCY MEDICINE
Payer: COMMERCIAL

## 2022-09-10 ENCOUNTER — APPOINTMENT (OUTPATIENT)
Dept: GENERAL RADIOLOGY | Facility: CLINIC | Age: 62
End: 2022-09-10
Attending: EMERGENCY MEDICINE
Payer: COMMERCIAL

## 2022-09-10 VITALS
RESPIRATION RATE: 18 BRPM | DIASTOLIC BLOOD PRESSURE: 70 MMHG | OXYGEN SATURATION: 98 % | HEART RATE: 77 BPM | TEMPERATURE: 98.6 F | SYSTOLIC BLOOD PRESSURE: 119 MMHG

## 2022-09-10 DIAGNOSIS — R07.9 CHEST PAIN, UNSPECIFIED TYPE: ICD-10-CM

## 2022-09-10 LAB
ANION GAP SERPL CALCULATED.3IONS-SCNC: 2 MMOL/L (ref 3–14)
ATRIAL RATE - MUSE: 69 BPM
BASOPHILS # BLD AUTO: 0 10E3/UL (ref 0–0.2)
BASOPHILS NFR BLD AUTO: 0 %
BUN SERPL-MCNC: 14 MG/DL (ref 7–30)
CALCIUM SERPL-MCNC: 8.9 MG/DL (ref 8.5–10.1)
CHLORIDE BLD-SCNC: 108 MMOL/L (ref 94–109)
CO2 SERPL-SCNC: 32 MMOL/L (ref 20–32)
CREAT SERPL-MCNC: 0.8 MG/DL (ref 0.52–1.04)
D DIMER PPP FEU-MCNC: <0.27 UG/ML FEU (ref 0–0.5)
DIASTOLIC BLOOD PRESSURE - MUSE: NORMAL MMHG
EOSINOPHIL # BLD AUTO: 0.1 10E3/UL (ref 0–0.7)
EOSINOPHIL NFR BLD AUTO: 2 %
ERYTHROCYTE [DISTWIDTH] IN BLOOD BY AUTOMATED COUNT: 12.6 % (ref 10–15)
GFR SERPL CREATININE-BSD FRML MDRD: 82 ML/MIN/1.73M2
GLUCOSE BLD-MCNC: 97 MG/DL (ref 70–99)
HCT VFR BLD AUTO: 44 % (ref 35–47)
HGB BLD-MCNC: 14.3 G/DL (ref 11.7–15.7)
HOLD SPECIMEN: NORMAL
HOLD SPECIMEN: NORMAL
IMM GRANULOCYTES # BLD: 0 10E3/UL
IMM GRANULOCYTES NFR BLD: 0 %
INTERPRETATION ECG - MUSE: NORMAL
LYMPHOCYTES # BLD AUTO: 1.5 10E3/UL (ref 0.8–5.3)
LYMPHOCYTES NFR BLD AUTO: 33 %
MCH RBC QN AUTO: 30.3 PG (ref 26.5–33)
MCHC RBC AUTO-ENTMCNC: 32.5 G/DL (ref 31.5–36.5)
MCV RBC AUTO: 93 FL (ref 78–100)
MONOCYTES # BLD AUTO: 0.3 10E3/UL (ref 0–1.3)
MONOCYTES NFR BLD AUTO: 7 %
NEUTROPHILS # BLD AUTO: 2.6 10E3/UL (ref 1.6–8.3)
NEUTROPHILS NFR BLD AUTO: 58 %
NRBC # BLD AUTO: 0 10E3/UL
NRBC BLD AUTO-RTO: 0 /100
P AXIS - MUSE: 81 DEGREES
PLATELET # BLD AUTO: 185 10E3/UL (ref 150–450)
POTASSIUM BLD-SCNC: 4.2 MMOL/L (ref 3.4–5.3)
PR INTERVAL - MUSE: 144 MS
QRS DURATION - MUSE: 92 MS
QT - MUSE: 384 MS
QTC - MUSE: 411 MS
R AXIS - MUSE: 75 DEGREES
RBC # BLD AUTO: 4.72 10E6/UL (ref 3.8–5.2)
SODIUM SERPL-SCNC: 142 MMOL/L (ref 133–144)
SYSTOLIC BLOOD PRESSURE - MUSE: NORMAL MMHG
T AXIS - MUSE: 64 DEGREES
TROPONIN I SERPL HS-MCNC: 3 NG/L
TROPONIN I SERPL HS-MCNC: <3 NG/L
VENTRICULAR RATE- MUSE: 69 BPM
WBC # BLD AUTO: 4.5 10E3/UL (ref 4–11)

## 2022-09-10 PROCEDURE — 84484 ASSAY OF TROPONIN QUANT: CPT | Performed by: EMERGENCY MEDICINE

## 2022-09-10 PROCEDURE — 80048 BASIC METABOLIC PNL TOTAL CA: CPT | Performed by: EMERGENCY MEDICINE

## 2022-09-10 PROCEDURE — 85025 COMPLETE CBC W/AUTO DIFF WBC: CPT | Performed by: EMERGENCY MEDICINE

## 2022-09-10 PROCEDURE — 36415 COLL VENOUS BLD VENIPUNCTURE: CPT | Performed by: EMERGENCY MEDICINE

## 2022-09-10 PROCEDURE — 93005 ELECTROCARDIOGRAM TRACING: CPT

## 2022-09-10 PROCEDURE — 99285 EMERGENCY DEPT VISIT HI MDM: CPT | Mod: 25

## 2022-09-10 PROCEDURE — 85379 FIBRIN DEGRADATION QUANT: CPT | Performed by: EMERGENCY MEDICINE

## 2022-09-10 PROCEDURE — 71046 X-RAY EXAM CHEST 2 VIEWS: CPT

## 2022-09-10 ASSESSMENT — ENCOUNTER SYMPTOMS
LIGHT-HEADEDNESS: 0
COUGH: 0
FEVER: 0
CHOKING: 0
SHORTNESS OF BREATH: 0
VOMITING: 0
WOUND: 0
CHILLS: 0
PALPITATIONS: 0
DYSURIA: 0
BACK PAIN: 0
ABDOMINAL PAIN: 0
NAUSEA: 0

## 2022-09-10 ASSESSMENT — ACTIVITIES OF DAILY LIVING (ADL): ADLS_ACUITY_SCORE: 35

## 2022-09-10 NOTE — ED TRIAGE NOTES
Patient had half hour of stabbing chest pain last night on left side, was able to go to sleep. Patient has no discomfort now but wants to get checked out.

## 2022-09-11 NOTE — ED PROVIDER NOTES
History     Chief Complaint:  Chest Pain       HPI   Jess Ureña is a 62 year old female, otherwise healthy, who presents with left-sided chest pain.  Last night, the patient developed acute onset of sharp left-sided chest pain that was worse with deep breathing and movement.  She has had this intermittently for the last 25 years.  The pain starts in the left axilla, and wraps around to the front of her chest, and ascends up to the top of her sternum.  It lasted about 30 minutes to an hour.  She went to sleep, and thinks that she was able to sleep relatively well.  She did not have any pain this morning.  She denies any shortness of breath, leg swelling, calf pain, fever, chills, or cough.  No pain with exertion.  She called her clinic, and they said that she needed to be seen in the emergency department.  She has never seen a physician for this in the past.  She has not tried any medications.    ROS:  Review of Systems   Constitutional: Negative for chills and fever.   Respiratory: Negative for cough, choking and shortness of breath.    Cardiovascular: Positive for chest pain. Negative for palpitations and leg swelling.   Gastrointestinal: Negative for abdominal pain, nausea and vomiting.   Genitourinary: Negative for dysuria.   Musculoskeletal: Negative for back pain.   Skin: Negative for rash and wound.   Neurological: Negative for syncope and light-headedness.   All other systems reviewed and are negative.      Allergies:  No Known Allergies     Medications:    estradiol (VIVELLE-DOT) 0.0375 MG/24HR BIW patch  Multiple Vitamins-Minerals (OCUVITE PRESERVISION PO)  VITAMIN D PO        Past Medical History:    Past Medical History:   Diagnosis Date     External hemorrhoids      Leukopenia      Macular degeneration      Migraine with aura      MINERVA (obstructive sleep apnea)        Past Surgical History:    Past Surgical History:   Procedure Laterality Date     AS THYROID LOBECTOMY,UNILAT Left 02/09/2010      COLONOSCOPY  11/21/2012    Procedure: COLONOSCOPY;  COLONOSCOPY;  Surgeon: Makcenzie Norwood MD;  Location: [facility]     HYSTERECTOMY TOTAL ABDOMINAL  02/23/2005    with left salpingoopherectomy and right ovarian cystectomy; done for endometriosis     TONSILLECTOMY          Family History:    family history includes Breast Cancer in her mother; Gastrointestinal Disease in her mother.    Social History:   reports that she has never smoked. She has never used smokeless tobacco. She reports current alcohol use. She reports that she does not use drugs.  PCP: Stefanie Antonio     Physical Exam     Patient Vitals for the past 24 hrs:   BP Temp Temp src Pulse Resp SpO2   09/10/22 1523 119/70 98.6  F (37  C) Temporal 77 18 98 %        Physical Exam  General: alert, lying comfortably on gurney, accompanied by .  HENT: mucous membranes moist  CV: regular rate, regular rhythm  Resp: normal effort, clear throughout, no crackles or wheezing  GI: abdomen soft and nontender, no guarding  MSK: no bony tenderness, no crepitus or tenderness to the left chest wall.  Skin: appropriately warm and dry, no rash to chest wall  Extremities: no edema, calves non-tender  Neuro: alert, clear speech, oriented  Psych: normal mood and affect      Emergency Department Course   ECG:  ECG results from 09/10/22   EKG 12-lead, tracing only     Value    Systolic Blood Pressure     Diastolic Blood Pressure     Ventricular Rate 69    Atrial Rate 69    UT Interval 144    QRS Duration 92        QTc 411    P Axis 81    R AXIS 75    T Axis 64    Interpretation ECG      Sinus rhythm with sinus arrhythmia  Cannot rule out Anterior infarct , age undetermined  Abnormal ECG  When compared with ECG of 03-FEB-2010 15:52,  No significant change was found  Confirmed by GENERATED REPORT, COMPUTER ([id number]),  Stefanie Blanton ([id number]) on 9/10/2022 4:57:57 PM         Imaging:  XR Chest 2 Views   Final Result   IMPRESSION: Negative chest.          Report per radiology    Laboratory:  Labs Ordered and Resulted from Time of ED Arrival to Time of ED Departure   BASIC METABOLIC PANEL - Abnormal       Result Value    Sodium 142      Potassium 4.2      Chloride 108      Carbon Dioxide (CO2) 32      Anion Gap 2 (*)     Urea Nitrogen 14      Creatinine 0.80      Calcium 8.9      Glucose 97      GFR Estimate 82     TROPONIN I - Normal    Troponin I High Sensitivity 3     TROPONIN I - Normal    Troponin I High Sensitivity <3     D DIMER QUANTITATIVE - Normal    D-Dimer Quantitative <0.27     CBC WITH PLATELETS AND DIFFERENTIAL    WBC Count 4.5      RBC Count 4.72      Hemoglobin 14.3      Hematocrit 44.0      MCV 93      MCH 30.3      MCHC 32.5      RDW 12.6      Platelet Count 185      % Neutrophils 58      % Lymphocytes 33      % Monocytes 7      % Eosinophils 2      % Basophils 0      % Immature Granulocytes 0      NRBCs per 100 WBC 0      Absolute Neutrophils 2.6      Absolute Lymphocytes 1.5      Absolute Monocytes 0.3      Absolute Eosinophils 0.1      Absolute Basophils 0.0      Absolute Immature Granulocytes 0.0      Absolute NRBCs 0.0          Emergency Department Course:  Reviewed:  I reviewed nursing notes, vitals and past medical history    Assessments:   I obtained history and examined the patient as noted above.   2230 I rechecked the patient and explained findings.     Interventions:  Medications - No data to display     Disposition:  The patient was discharged to home.     Impression & Plan      Medical Decision Making:  Jess Ureña is a 62 year old female, otherwise healthy, who presents with chest pain.  Symptoms of pleuritic chest pain have been intermittent for years.  The work up in the Emergency Department is negative.  The differential diagnosis of chest pain is broad and includes life threatening etiologies such as acute coronary syndrome, myocardial infarction, pulmonary embolism, acute aortic dissection,  or esophageal rupture.  Other  causes may include pneumonia, pneumothorax, pericarditis, myocarditis, pleurisy, esophageal spasm.  No serious etiology for the chest pain were detected today during this visit.  Given s/he is low risk, negative d-dimer was adequate to rule out PE.  S/he is very low risk for ACS, and given atypical symptoms, provocative testing is not indicated.  Close follow up with primary care is indicated should the pain continue, as further work up may be performed; this was made clear to the patient, who understands.    Diagnosis:    ICD-10-CM    1. Chest pain, unspecified type  R07.9         Discharge Medications:  New Prescriptions    No medications on file        9/10/2022   Lizbeth Rodas MD Pepper, Tracy Lynn, MD  09/11/22 0843

## 2022-10-15 ENCOUNTER — HEALTH MAINTENANCE LETTER (OUTPATIENT)
Age: 62
End: 2022-10-15

## 2023-03-13 ENCOUNTER — OFFICE VISIT (OUTPATIENT)
Dept: VASCULAR SURGERY | Facility: CLINIC | Age: 63
End: 2023-03-13
Payer: COMMERCIAL

## 2023-03-13 DIAGNOSIS — I83.93 SPIDER VEINS OF BOTH LOWER EXTREMITIES: Primary | ICD-10-CM

## 2023-03-13 PROCEDURE — S9999 SALES TAX: HCPCS | Performed by: SURGERY

## 2023-03-13 PROCEDURE — 36468 NJX SCLRSNT SPIDER VEINS: CPT | Performed by: SURGERY

## 2023-03-13 NOTE — LETTER
3/13/2023         RE: Jess Ureña  [address] [place] [address]        Dear Colleague,    Thank you for referring your patient, Jess Ureña, to the [facility] [place]. Please see a copy of my visit note below.    [facility]: [place]    Jess Ureña has been evaluated by Dr. Schrader for sclerotherapy.  History of left GSV closure approximately 14 years ago.  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    Bilateral sclerotherapy on 5/12/2021 with repeat session 8/11/2021, 11/24/2021, and again 2/9/2022 with no complications (with Dr. Schrader)    PMH: Medications: Vivelle dot patch, multivitamins    PRESENT SITUATION: Has done well since last sclerotherapy session.  However she has developed a recurrent spider veins on both of her legs that are quite small but easily visible.  She does have a larger reticular vein over the left distal anterior lateral thigh with proximal lateral calf spider veins feeding into this.  Also more prominent spider veins and a few reticular veins on the right posterior mid and distal thigh.    We again did discuss risks and benefits of sclerotherapy and she is interested in proceeding.    Vein Clinic Sclerotherapy Note     Jess Ureña is a 62 year old female who was seen in clinic today for Sclerotherapy.    Sclerotherapy    Date/Time: 3/13/2023 3:42 PM  Performed by: Álvaro Sanchez MD  Authorized by: Álvaro Sanchez MD     Time out: Immediately prior to the procedure a time out was called    Circulator: Yaima Larose RN     Type:  Cosmetic  Session:  Full  Procedure side:  Bilateral  Solution/Amount:  .5 POLIDOCANOL  Patient tolerance:  Patient tolerated the procedure well with no immediate complications  Wrap/Hose:  Hose        We used the [employer] magnified system and a 30-gauge needle.  A total of 8 syringes of undiluted 0.5 polidocanol solution  were used.  5 of these 8 syringes were foamed 1:1 to aid in treating of the reticular veins particular on the right posterior thigh and right distal anterior lateral thigh and proximal calf.  Very few spider veins noted on the calf themselves but all were treated including the visible thigh spider veins.    She had no complication of the procedure and tolerated this with no discomfort.  Compression was applied for 24 hours and she will continue this for 3 to 5 days.  No concerns about sun exposure at this time of year but is aware of the concerns.    I treated all visible spider veins and reticular veins today.  She may require further treatments but I definitely would wait till fall or winter and this was discussed.    Full session of sclerotherapy today which was a cosmetic procedure.    Álvaro Sanchez MD         Dictated using Dragon voice recognition software which may result in transcription errors      Again, thank you for allowing me to participate in the care of your patient.        Sincerely,        Álvaro Sanchez MD

## 2023-03-13 NOTE — PROGRESS NOTES
[facility]: [place]    Jess Ureña has been evaluated by Dr. Schrader for sclerotherapy.  History of left GSV closure approximately 14 years ago.  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    Bilateral sclerotherapy on 5/12/2021 with repeat session 8/11/2021, 11/24/2021, and again 2/9/2022 with no complications (with Dr. Schrader)    PMH: Medications: Vivelle dot patch, multivitamins    PRESENT SITUATION: Has done well since last sclerotherapy session.  However she has developed a recurrent spider veins on both of her legs that are quite small but easily visible.  She does have a larger reticular vein over the left distal anterior lateral thigh with proximal lateral calf spider veins feeding into this.  Also more prominent spider veins and a few reticular veins on the right posterior mid and distal thigh.    We again did discuss risks and benefits of sclerotherapy and she is interested in proceeding.    Vein Clinic Sclerotherapy Note     Jess Ureña is a 62 year old female who was seen in clinic today for Sclerotherapy.    Sclerotherapy    Date/Time: 3/13/2023 3:42 PM  Performed by: Álvaro Sanchez MD  Authorized by: Álvaro Sanchez MD     Time out: Immediately prior to the procedure a time out was called    Circulator: Yaima Larose RN     Type:  Cosmetic  Session:  Full  Procedure side:  Bilateral  Solution/Amount:  .5 POLIDOCANOL  Patient tolerance:  Patient tolerated the procedure well with no immediate complications  Wrap/Hose:  Hose        We used the [employer] polarized magnified system and a 30-gauge needle.  A total of 8 syringes of undiluted 0.5 polidocanol solution were used.  5 of these 8 syringes were foamed 1:1 to aid in treating of the reticular veins particular on the right posterior thigh and right distal anterior lateral thigh and proximal calf.  Very few spider veins noted on the calf themselves but all  were treated including the visible thigh spider veins.    She had no complication of the procedure and tolerated this with no discomfort.  Compression was applied for 24 hours and she will continue this for 3 to 5 days.  No concerns about sun exposure at this time of year but is aware of the concerns.    I treated all visible spider veins and reticular veins today.  She may require further treatments but I definitely would wait till fall or winter and this was discussed.    Full session of sclerotherapy today which was a cosmetic procedure.    Álvaro Sanchez MD         Dictated using Dragon voice recognition software which may result in transcription errors

## 2023-04-05 ENCOUNTER — TELEPHONE (OUTPATIENT)
Dept: VASCULAR SURGERY | Facility: CLINIC | Age: 63
End: 2023-04-05
Payer: COMMERCIAL

## 2023-04-05 NOTE — TELEPHONE ENCOUNTER
Pt called stating she thinks she has some trapped blood in the spider vein/reticular vein to her left distal lateral thigh. Pt describes the area as a tender little purple-colored lump. Scheduled pt for Monday 4/10 (4 week post sclero treatment) to have Dr. Sanchez assess area.    Loretta Rahman RN  [facility]  Vein Clinic

## 2023-04-10 ENCOUNTER — OFFICE VISIT (OUTPATIENT)
Dept: VASCULAR SURGERY | Facility: CLINIC | Age: 63
End: 2023-04-10
Payer: COMMERCIAL

## 2023-04-10 DIAGNOSIS — I83.93 SPIDER VEINS OF BOTH LOWER EXTREMITIES: Primary | ICD-10-CM

## 2023-04-10 PROCEDURE — 99207 PR VEINSOLUTIONS POST OPERATIVE VISIT: CPT | Performed by: SURGERY

## 2023-04-10 NOTE — PROGRESS NOTES
[facility]: [place] Ure[place] returns for follow-up visit.  Underwent sclerotherapy using 8 syringes of undiluted 0.5% polidocanol  for spider and reticular veins Syris primary on the right thigh and proximal calf on 3/13/2023.  We did treat several areas in the left thigh also.    She did well following the procedure.  However in the left distal lateral thighs she had 1 reticular vein that has trapped blood as prominent.  No other reactions or issues with the sclerotherapy.    Exam: Alert and appropriate.  Normal affect.   Overall good results from sclerotherapy though she still has a few small spider veins   Present   A small reticular vein that was treated on the left distal lateral thigh does have trapped    blood but otherwise looks fine.     Procedure: Discussed with patient.  She wanted to proceed.  Area prepped with alcohol.  Using a #20 needle I made 2 small openings into the reticular vein and expressed the thrombosed blood with good initial results.  Band-Aid to the area.    IMPRESSION: Overall very good results with sclerotherapy.  She still has several small spider veins that could be treated but will wait till fall and treat if she so desires.  Small area of trapped blood was successfully evacuated today.    Álvaro Sanchez MD

## 2023-04-10 NOTE — LETTER
4/10/2023         RE: Jess Ureña  [address] [place] [address]        Dear Colleague,    Thank you for referring your patient, Jess Ureña, to the [facility] [place]. Please see a copy of my visit note below.    [facility]: [place] Ure[place] returns for follow-up visit.  Underwent sclerotherapy using 8 syringes of undiluted 0.5% polidocanol  for spider and reticular veins Syris primary on the right thigh and proximal calf on 3/13/2023.  We did treat several areas in the left thigh also.    She did well following the procedure.  However in the left distal lateral thighs she had 1 reticular vein that has trapped blood as prominent.  No other reactions or issues with the sclerotherapy.    Exam: Alert and appropriate.  Normal affect.   Overall good results from sclerotherapy though she still has a few small spider veins   Present   A small reticular vein that was treated on the left distal lateral thigh does have trapped    blood but otherwise looks fine.     Procedure: Discussed with patient.  She wanted to proceed.  Area prepped with alcohol.  Using a #20 needle I made 2 small openings into the reticular vein and expressed the thrombosed blood with good initial results.  Band-Aid to the area.    IMPRESSION: Overall very good results with sclerotherapy.  She still has several small spider veins that could be treated but will wait till fall and treat if she so desires.  Small area of trapped blood was successfully evacuated today.    Álvaro Sanchez MD               Again, thank you for allowing me to participate in the care of your patient.        Sincerely,        Álvaro Sanchez MD

## 2023-10-29 ENCOUNTER — HEALTH MAINTENANCE LETTER (OUTPATIENT)
Age: 63
End: 2023-10-29

## 2024-08-04 ENCOUNTER — HEALTH MAINTENANCE LETTER (OUTPATIENT)
Age: 64
End: 2024-08-04

## 2024-12-21 ENCOUNTER — HEALTH MAINTENANCE LETTER (OUTPATIENT)
Age: 64
End: 2024-12-21

## 2025-08-16 ENCOUNTER — HEALTH MAINTENANCE LETTER (OUTPATIENT)
Age: 65
End: 2025-08-16